# Patient Record
Sex: FEMALE | Race: WHITE
[De-identification: names, ages, dates, MRNs, and addresses within clinical notes are randomized per-mention and may not be internally consistent; named-entity substitution may affect disease eponyms.]

---

## 2017-04-27 ENCOUNTER — HOSPITAL ENCOUNTER (OUTPATIENT)
Dept: HOSPITAL 53 - M LAB | Age: 82
End: 2017-04-27
Attending: FAMILY MEDICINE
Payer: MEDICARE

## 2017-04-27 DIAGNOSIS — K57.32: ICD-10-CM

## 2017-04-27 DIAGNOSIS — Z79.899: ICD-10-CM

## 2017-04-27 DIAGNOSIS — R19.04: Primary | ICD-10-CM

## 2017-04-27 LAB
ALBUMIN SERPL BCG-MCNC: 3.5 GM/DL (ref 3.2–5.2)
ALBUMIN/GLOB SERPL: 1.21 {RATIO} (ref 1–1.93)
ALP SERPL-CCNC: 81 U/L (ref 45–117)
ALT SERPL W P-5'-P-CCNC: 24 U/L (ref 12–78)
ANION GAP SERPL CALC-SCNC: 8 MEQ/L (ref 8–16)
AST SERPL-CCNC: 22 U/L (ref 15–37)
BILIRUB SERPL-MCNC: 0.5 MG/DL (ref 0.2–1)
BUN SERPL-MCNC: 29 MG/DL (ref 7–18)
CALCIUM SERPL-MCNC: 8.2 MG/DL (ref 8.8–10.2)
CHLORIDE SERPL-SCNC: 110 MEQ/L (ref 98–107)
CO2 SERPL-SCNC: 28 MEQ/L (ref 21–32)
CREAT SERPL-MCNC: 1.48 MG/DL (ref 0.55–1.02)
ERYTHROCYTE [DISTWIDTH] IN BLOOD BY AUTOMATED COUNT: 11.9 % (ref 11.5–14.5)
GFR SERPL CREATININE-BSD FRML MDRD: 36.2 ML/MIN/{1.73_M2} (ref 39–?)
GLUCOSE SERPL-MCNC: 94 MG/DL (ref 83–110)
MCH RBC QN AUTO: 30.3 PG (ref 27–33)
MCHC RBC AUTO-ENTMCNC: 32 G/DL (ref 32–36.5)
MCV RBC AUTO: 94.5 FL (ref 80–96)
PLATELET # BLD AUTO: 186 K/MM3 (ref 150–450)
POTASSIUM SERPL-SCNC: 4.4 MEQ/L (ref 3.5–5.1)
PROT SERPL-MCNC: 6.4 GM/DL (ref 6.4–8.2)
SODIUM SERPL-SCNC: 146 MEQ/L (ref 136–145)
WBC # BLD AUTO: 6.9 K/MM3 (ref 4–10)

## 2017-05-03 ENCOUNTER — HOSPITAL ENCOUNTER (OUTPATIENT)
Dept: HOSPITAL 53 - M RAD | Age: 82
End: 2017-05-03
Attending: FAMILY MEDICINE
Payer: MEDICARE

## 2017-05-03 DIAGNOSIS — K57.30: ICD-10-CM

## 2017-05-03 DIAGNOSIS — R19.04: Primary | ICD-10-CM

## 2017-05-03 PROCEDURE — 74178 CT ABD&PLV WO CNTR FLWD CNTR: CPT

## 2017-05-03 NOTE — REP
CT ABDOMEN:

 

REASON: History of left lower quadrant mass.

 

COMPARISON EXAMINATION: None.

 

CONTRAST UTILIZED: 100 mL Isovue-[id number].

 

The precontrast enhanced portion of the examination shows the hepatic and splenic

densities to be within normal limits. There is no cholelithiasis or

nephrolithiasis.

 

Contrast enhanced portion of the examination shows the liver, gallbladder,

spleen, pancreas, adrenal glands, and kidneys to be within normal limits. There

is an incidental centimeter sized cyst in the right kidney and an incidental

subcentimeter sized cyst in the left kidney. The abdominal aorta and paraaortic

regions are within normal limits for the patients age. Calcific atherosclerotic

changes are seen in the abdominal aorta. There is no evidence of an

intraabdominal mass or adenopathy. There is no free fluid or free air. The bowel

loops and their mesenteries are within normal limits.

 

CT PELVIS:

 

High in the left adnexa along the pelvic sidewall there is a 2 cm sized low

density structure which has water Hounsfield unit readings. There is extensive

sigmoid colon diverticulosis. The bowel loops and mesenteries are otherwise

unremarkable. No free fluid or free air is seen in the pelvis.

 

Bone window technique throughout the exam shows age related spinal degenerative

changes.

 

IMPRESSION:

 

1. There is a small cyst in the pelvis as described above which is possibly of

ovarian origin. Exact etiology is uncertain. Consider pelvic ultrasonography.

 

2. Sigmoid colon diverticulosis.

 

3. Incidental bilateral renal cysts.

 

4. Other findings as described above.

 

 

Signed by

Stephane Yanez DO 05/04/2017 04:23 P

## 2018-06-14 ENCOUNTER — HOSPITAL ENCOUNTER (OUTPATIENT)
Dept: HOSPITAL 53 - M LAB | Age: 83
End: 2018-06-14
Attending: FAMILY MEDICINE
Payer: MEDICARE

## 2018-06-14 DIAGNOSIS — E78.00: ICD-10-CM

## 2018-06-14 DIAGNOSIS — I10: Primary | ICD-10-CM

## 2018-06-14 DIAGNOSIS — Z01.818: ICD-10-CM

## 2018-06-14 DIAGNOSIS — Z79.899: ICD-10-CM

## 2018-06-14 DIAGNOSIS — E03.9: ICD-10-CM

## 2018-06-14 LAB
ALBUMIN/GLOBULIN RATIO: 1.19 (ref 1–1.93)
ALBUMIN: 3.8 GM/DL (ref 3.2–5.2)
ALKALINE PHOSPHATASE: 87 U/L (ref 45–117)
ALT SERPL W P-5'-P-CCNC: 26 U/L (ref 12–78)
ANION GAP: 9 MEQ/L (ref 8–16)
AST SERPL-CCNC: 24 U/L (ref 7–37)
BILIRUBIN,TOTAL: 0.7 MG/DL (ref 0.2–1)
BLOOD UREA NITROGEN: 30 MG/DL (ref 7–18)
CALCIUM LEVEL: 8.7 MG/DL (ref 8.8–10.2)
CARBON DIOXIDE LEVEL: 26 MEQ/L (ref 21–32)
CHLORIDE LEVEL: 109 MEQ/L (ref 98–107)
CHOLEST SERPL-MCNC: 196 MG/DL (ref ?–200)
CHOLESTEROL RISK RATIO: 3.06 (ref ?–5)
CREATININE FOR GFR: 1.47 MG/DL (ref 0.55–1.3)
EST. AVERAGE GLUCOSE BLD GHB EST-MCNC: 105 MG/DL (ref 60–110)
GFR SERPL CREATININE-BSD FRML MDRD: 36.4 ML/MIN/{1.73_M2} (ref 32–?)
GLUCOSE, FASTING: 88 MG/DL (ref 70–100)
HDLC SERPL-MCNC: 64 MG/DL (ref 40–?)
HEMATOCRIT: 39.7 % (ref 36–47)
HEMOGLOBIN: 12.6 G/DL (ref 12–15.5)
INR: 0.97
LDL CHOLESTEROL: 107.4 MG/DL (ref ?–100)
MEAN CORPUSCULAR HEMOGLOBIN: 30.4 PG (ref 27–33)
MEAN CORPUSCULAR HGB CONC: 31.7 G/DL (ref 32–36.5)
MEAN CORPUSCULAR VOLUME: 95.7 FL (ref 80–96)
NONHDLC SERPL-MCNC: 132 MG/DL
NRBC BLD AUTO-RTO: 0 % (ref 0–0)
PLATELET COUNT, AUTOMATED: 191 10^3/UL (ref 150–450)
POTASSIUM SERUM: 4.7 MEQ/L (ref 3.5–5.1)
PROTHROMBIN TIME: 13 SECONDS (ref 12.4–14.5)
RED BLOOD COUNT: 4.15 10^6/UL (ref 4–5.4)
RED CELL DISTRIBUTION WIDTH: 12.5 % (ref 11.5–14.5)
SODIUM LEVEL: 144 MEQ/L (ref 136–145)
THYROID STIMULATING HORMONE: 0.07 UIU/ML (ref 0.36–3.74)
TOTAL PROTEIN: 7 GM/DL (ref 6.4–8.2)
TRIGLYCERIDES LEVEL: 123 MG/DL (ref ?–150)
WHITE BLOOD COUNT: 6.8 10^3/UL (ref 4–10)

## 2018-06-14 PROCEDURE — 71046 X-RAY EXAM CHEST 2 VIEWS: CPT

## 2018-12-21 ENCOUNTER — HOSPITAL ENCOUNTER (OUTPATIENT)
Dept: HOSPITAL 53 - M WUC | Age: 83
End: 2018-12-21
Attending: FAMILY MEDICINE
Payer: MEDICARE

## 2018-12-21 DIAGNOSIS — D64.9: Primary | ICD-10-CM

## 2018-12-21 DIAGNOSIS — R53.83: ICD-10-CM

## 2018-12-21 DIAGNOSIS — E03.9: ICD-10-CM

## 2018-12-21 LAB
25(OH)D3 SERPL-MCNC: 30.5 NG/ML (ref 30–100)
ALBUMIN SERPL BCG-MCNC: 3.8 GM/DL (ref 3.2–5.2)
ALT SERPL W P-5'-P-CCNC: 20 U/L (ref 12–78)
BILIRUB SERPL-MCNC: 0.6 MG/DL (ref 0.2–1)
BUN SERPL-MCNC: 38 MG/DL (ref 7–18)
CALCIUM SERPL-MCNC: 9 MG/DL (ref 8.8–10.2)
CHLORIDE SERPL-SCNC: 108 MEQ/L (ref 98–107)
CHOLEST SERPL-MCNC: 174 MG/DL (ref ?–200)
CHOLEST/HDLC SERPL: 3.28 {RATIO} (ref ?–5)
CO2 SERPL-SCNC: 27 MEQ/L (ref 21–32)
CREAT SERPL-MCNC: 1.53 MG/DL (ref 0.55–1.3)
EST. AVERAGE GLUCOSE BLD GHB EST-MCNC: 117 MG/DL (ref 60–110)
GFR SERPL CREATININE-BSD FRML MDRD: 34.7 ML/MIN/{1.73_M2} (ref 32–?)
GLUCOSE SERPL-MCNC: 93 MG/DL (ref 70–100)
HCT VFR BLD AUTO: 41.6 % (ref 36–47)
HDLC SERPL-MCNC: 53 MG/DL (ref 40–?)
HGB BLD-MCNC: 13.2 G/DL (ref 12–15.5)
LDLC SERPL CALC-MCNC: 93 MG/DL (ref ?–100)
MCH RBC QN AUTO: 30.5 PG (ref 27–33)
MCHC RBC AUTO-ENTMCNC: 31.7 G/DL (ref 32–36.5)
MCV RBC AUTO: 96.1 FL (ref 80–96)
NONHDLC SERPL-MCNC: 121 MG/DL
PLATELET # BLD AUTO: 192 10^3/UL (ref 150–450)
POTASSIUM SERPL-SCNC: 4.7 MEQ/L (ref 3.5–5.1)
PROT SERPL-MCNC: 6.8 GM/DL (ref 6.4–8.2)
RBC # BLD AUTO: 4.33 10^6/UL (ref 4–5.4)
SODIUM SERPL-SCNC: 142 MEQ/L (ref 136–145)
TRIGL SERPL-MCNC: 139 MG/DL (ref ?–150)
TSH SERPL DL<=0.005 MIU/L-ACNC: 0.53 UIU/ML (ref 0.36–3.74)
VIT B12 SERPL-MCNC: 559 PG/ML (ref 247–911)
WBC # BLD AUTO: 6.9 10^3/UL (ref 4–10)

## 2021-08-19 ENCOUNTER — HOSPITAL ENCOUNTER (OUTPATIENT)
Dept: HOSPITAL 53 - M WUC | Age: 86
End: 2021-08-19
Attending: PHYSICIAN ASSISTANT
Payer: MEDICARE

## 2021-08-19 DIAGNOSIS — N39.0: Primary | ICD-10-CM

## 2021-09-02 ENCOUNTER — HOSPITAL ENCOUNTER (OUTPATIENT)
Dept: HOSPITAL 53 - M LAB REF | Age: 86
End: 2021-09-02
Attending: PHYSICIAN ASSISTANT
Payer: MEDICARE

## 2021-09-02 DIAGNOSIS — N39.0: Primary | ICD-10-CM

## 2021-12-12 ENCOUNTER — HOSPITAL ENCOUNTER (EMERGENCY)
Dept: HOSPITAL 53 - M ED | Age: 86
Discharge: HOME | End: 2021-12-12
Payer: MEDICARE

## 2021-12-12 VITALS — SYSTOLIC BLOOD PRESSURE: 141 MMHG | DIASTOLIC BLOOD PRESSURE: 68 MMHG

## 2021-12-12 VITALS — BODY MASS INDEX: 29.69 KG/M2 | HEIGHT: 62 IN | WEIGHT: 161.33 LBS

## 2021-12-12 DIAGNOSIS — E03.9: ICD-10-CM
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
## 2021-12-12 DIAGNOSIS — Z87.891: ICD-10-CM

## 2021-12-12 DIAGNOSIS — I10: ICD-10-CM

## 2021-12-12 DIAGNOSIS — S42.292A: Primary | ICD-10-CM

## 2021-12-12 DIAGNOSIS — Z88.2: ICD-10-CM
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
## 2021-12-12 DIAGNOSIS — Z88.1: ICD-10-CM

## 2021-12-12 NOTE — REP
INDICATION:

fall, injury, include shoulder/clavicle.



COMPARISON:

Comparison is made with shows the left proximal humerus on June 14, 2018.  Chest

radiograph which



TECHNIQUE:

Three views of the left humerus are provided.



FINDINGS:

Three views of the left humerus demonstrate an impacted fracture of the surgical neck

of the left humerus with some comminution but no displacement.  The glenohumeral and

acromioclavicular joints are normally aligned.  No scapular or clavicle fracture is

appreciated.    No opaque foreign body noted.





IMPRESSION:

Impacted fracture surgical neck left humerus.







<Electronically signed by David Broussard > 12/12/21 [id number]

## 2021-12-12 NOTE — CCD
Summarization Of Episode

                             Created on: 2021



TALON MARTEL

External Reference #: [id number]

: 1935

Sex: Undifferentiated



Demographics





                          Address                   [address] [place]  [address]

 

                          Home Phone                [phone]

 

                          Preferred Language        English

 

                          Marital Status            Unknown

 

                          Adventism Affiliation     Unknown

 

                          Race                      Unknown

 

                          Ethnic Group              Not  or 





Author





                          Author                    [facility]

 

                          Organization              [facility]

 

                          Address                   Unknown

 

                          Phone                     Unavailable







Support





                Name            Relationship    Address         Phone

 

                RET             Next Of Kin     Unknown         Unavailable

 

                    MOY BARRAGAN    Next Of Kin         [address]

[address] 

[place]  [address] [phone]

 

                    CLAY RASMUSSEN    [place]  [address]                       [phone]

 

                AMAURI              Next Of Kin     Unknown         Unavailable

 

                    DONALD BARRAG[place]   [place]  [address] [phone]







Care Team Providers





                    Care Team Member Name Role                Phone

 

                    Morley,  Diana NP Unavailable         Unavailable

 

                    Morley,  Diana NP Unavailable         Unavailable

 

                    Morley,  Diana NP Unavailable         Unavailable

 

                    Morley,  Diana NP Unavailable         Unavailable

 

                    Morley,  Diana NP Unavailable         Unavailable

 

                    Morley,  Diana NP Unavailable         Unavailable

 

                    Morley,  Diana NP Unavailable         Unavailable

 

                    Morley,  Diana NP Unavailable         Unavailable

 

                    Morley,  Diana NP Unavailable         Unavailable

 

                    Morley,  Diana NP Unavailable         Unavailable

 

                    Morley,  Diana NP Unavailable         Unavailable

 

                    Morley,  Diana NP Unavailable         Unavailable

 

                    Morley,  Diana NP Unavailable         Unavailable

 

                    AYDEN,  MARY PA Unavailable         Unavailable

 

                    AYDEN,  MARY PA Unavailable         Unavailable

 

                    AYDEN,  MARY PA Unavailable         Unavailable

 

                    AYDEN,  MARY PA Unavailable         Unavailable

 

                    AYDEN,  MARY PA Unavailable         Unavailable

 

                    AYDEN,  MARY PA Unavailable         Unavailable

 

                    AYDEN,  MARY PA Unavailable         Unavailable

 

                    AYDEN,  MARY PA Unavailable         Unavailable

 

                    AYDEN,  MARY PA Unavailable         Unavailable

 

                    AYDEN,  MARY PA Unavailable         Unavailable

 

                    AYDEN,  MARY PA Unavailable         Unavailable

 

                    AYDEN,  MARY PA Unavailable         Unavailable

 

                    AYDEN,  MARY PA Unavailable         Unavailable

 

                    AYDEN,  MARY PA Unavailable         Unavailable

 

                    AYDEN,  MARY PA Unavailable         Unavailable

 

                    AYDEN,  MARY PA Unavailable         Unavailable

 

                    AYDEN,  MARY PA Unavailable         Unavailable

 

                    AYDEN,  MARY PA Unavailable         Unavailable

 

                    AYDEN,  MARY PA Unavailable         Unavailable

 

                    AYDEN,  MARY PA Unavailable         Unavailable

 

                    AYDEN,  MARY PA Unavailable         Unavailable

 

                    AYDEN,  AMRY PA Unavailable         Unavailable

 

                    AYDEN,  MARY PA Unavailable         Unavailable

 

                    AYDEN,  MARY PA Unavailable         Unavailable

 

                    AYDEN,  MARY PA Unavailable         Unavailable

 

                    AYDEN,  MARY PA Unavailable         Unavailable

 

                    AYDEN,  MARY PA Unavailable         Unavailable

 

                    AYDEN,  MARY PA Unavailable         Unavailable

 

                    AYDEN,  MARY PA Unavailable         Unavailable

 

                    AYDEN,  MARY PA Unavailable         Unavailable

 

                    AYDEN,  MARY PA Unavailable         Unavailable

 

                    AYDEN,  MARY PA Unavailable         Unavailable

 

                    AYDEN,  MARY PA Unavailable         Unavailable

 

                    AYDEN,  MARY PA Unavailable         Unavailable

 

                    AYDEN,  MARY PA Unavailable         Unavailable

 

                    AYDEN,  MARY PA Unavailable         Unavailable



                                  



Re-disclosure Warning

          The records that you are about to access may contain information from 
federally-assisted alcohol or drug abuse programs. If such information is 
present, then the following federally mandated warning applies: This information
has been disclosed to you from records protected by federal confidentiality 
rules (42 CFR part 2). The federal rules prohibit you from making any further 
disclosure of this information unless further disclosure is expressly permitted 
by the written consent of the person to whom it pertains or as otherwise 
permitted by 42 CFR part 2. A general authorization for the release of medical 
or other information is NOT sufficient for this purpose. The Federal rules 
restrict any use of the information to criminally investigate or prosecute any 
alcohol or drug abuse patient.The records that you are about to access may 
contain highly sensitive health information, the redisclosure of which is 
protected by Article 27-F of the [facility] Public Health law. If you 
continue you may have access to information: Regarding HIV / AIDS; Provided by 
facilities licensed or operated by the [facility] Office of Mental Health; 
or Provided by the [facility] Office for People With Developmental 
Disabilities. If such information is present, then the following [place] 
mandated warning applies: This information has been disclosed to you from 
confidential records which are protected by state law. State law prohibits you 
from making any further disclosure of this information without the specific 
written consent of the person to whom it pertains, or as otherwise permitted by 
law. Any unauthorized further disclosure in violation of state law may result in
a fine or group home sentence or both. A general authorization for the release of 
medical or other information is NOT sufficient authorization for further disc
losure.                                                                         
    



Family History

          



             Family Member Name Family Member Gender Family Member Status Date o

f Status 

Description                             Data Source(s)

 

           Unknown    Unknown    Problem                          [facility] ([facility]

 

           Unknown    Unknown    Problem                          [facility] ([facility]



                                                                                
       



Encounters

          



           Encounter  Providers  Location   Date       Indications Data Source(s

)

 

                Outpatient      Attender: MARY Banda

ry 2021 

08:05:00 AM EDT                                     [facility]

 

                Outpatient      Attender: MARY Banda

ry 2021 

10:45:00 AM EDT                                     [facility]

 

                Outpatient      Attender: Diana loya 2021 

02:50:00 PM EDT                                     [facility]



                                                                                
                           



Immunizations

          



             Vaccine      Date         Status       Description  Data Source(s)

 

             COVID-19 VACCINE Moderna 2021 12:00:00 AM EST completed      

           [facility]

 

                                        Vaccine Series Complete: YESThis Data wa

s Submitted to [facility] [employer]. 

 

             COVID-19 VACCINE Moderna 2021 12:00:00 AM EDT completed      

           [facility]

 

                                        Vaccine Series Complete: YESThis Data wa

s Submitted to [facility] [employer]. 

 

             COVID-19 VACCINE Moderna 2021 12:00:00 AM EDT completed      

           [facility]

 

                                        Vaccine Series Complete: NOThis Data was

 Submitted to [facility] [employer]. 



                                                                                
                           



Medications

          



          Medication Brand Name Start Date Product Form Dose      Route     Admi

nistrative 

Instructions Pharmacy Instructions Status     Indications Reaction   Description

 Data 

Source(s)

 

                                        NITROFURANTOIN, MACROCRYSTALS 25 MG / Ni

trofurantoin, Monohydrate 75 MG Oral 

Capsule [Macrobid] Macrobid 2021 12:00:00 AM EDT             ORAL         

     active              

                                        [facility] ([facility]

 

                                        NITROFURANTOIN, MACROCRYSTALS 25 MG / Ni

trofurantoin, Monohydrate 75 MG Oral 

Capsule [Macrobid] Macrobid 2021 12:00:00 AM EDT               ORAL       

          completed        

                                                    [facility]

 

             valacyclovir 1000 MG Oral Tablet Valacyclovir HCL 2021 12:00:

00 AM EDT               

                                completed                         [facility] ([facility])



                                                                                
        



Insurance Providers

          



             Payer name   Policy type / Coverage type Policy ID    Covered party

 ID Covered 

party's relationship to fischer Policy Fischer             Plan Information

 

                              [id number]                               [id number]

 

                              [id number]                               [id number]

[id number]

 

          MEDICARE            [id number]           SP                  [id number]

[id number]

 

          MEDICARE            [id number]           SP                  [id number]

[id number]

 

          [place]           [id number]           S                 

  [id number]

 

          R       O         [id number]  [id number] S                   [id number]

 

          MEDICARE C         [id number] S                   [id number]

[id number]

 

          [facility]           [id number]            SP               

   [id number]

 

          [employer]               [id number]           SP                  [id number]

 

          Medicare [employer] Medicare Primary           [id number]     Self   

              

 

          [employer]     Medigap Part B           [id number]     Self                 

 

          [employer] PPO O         [id number] S                   [id number]

 

                Medicare [place] Medicare Primary [id number]     

[id number] Self                                    

[id number]

 

          [employer]     Medigap Part B [id number]

43.0 Self                

[id number]

 

          [facility]           [id number]            SP               

   [id number]

 

          [facility]                 [id number]            S                   [id number]

 

          [employer]           [id number]           S           

        [id number]



                                                                                
                                                                                
                                                                                
        



Problems, Conditions, and Diagnoses

          No Information                                                        
                                



Surgeries/Procedures

          



             Procedure    Description  Date         Indications  Data Source(s)

 

             OFFICE OUTPATIENT VISIT 15 MINUTES              2021 12:00:00

 AM EDT              [facility] 

([facility])

 

             OFFICE OUTPATIENT VISIT 15 MINUTES              2021 12:00:00

 AM EDT              [facility] 

([facility])

 

             OFFICE OUTPATIENT NEW 30 MINUTES              2021 12:00:00 A

M EDT              [facility] 

([facility])



                                                                                
                            



Results

          



                    ID                  Date                Data Source

 

                    [id number]             2021 09:58:00 AM EDT [facility] ([facility])









          Name      Value     Range     Interpretation Code Description Data Anne-Marie

rce(s) Supporting 

Document(s)

 

           Bacteria identified in Urine by Culture Laboratory test result       

                           [facility] 

([facility])            

 

                                        Rx Macrobid 









                    ID                  Date                Data Source

 

                    [id number]             2021 11:19:00 AM EDT [facility] ([facility])









          Name      Value     Range     Interpretation Code Description Data Anne-Marie

rce(s) Supporting 

Document(s)

 

           Bacteria identified in Urine by Culture Laboratory test result       

                           [facility] 

([facility])            

 

                                        <content>FULL REPORT IN LAB NOTES (eCW a

nd 

[facility]).</content><br/><content></content><br/><content>ORGANISM 1: ESCHERICHIA 
COLI</content><br/><content></content><br/><content>COLONY COUNT                
  >
100,000</content><br/><content></content><br/><content></content><br/><content>O

RGANISM 1: ESCHERICHIA COLI</content><br/><content></content><br/><content>
ESCHERICHIA COLI:                                  
REACTION</content><br/><content>TRIMETHOPRIM/SULFAMETHOXAZOLE      IV         
160mg TMP & 800mg SMXq6h <=20      S</content><br/><content>
TRIMETHOPRIM/SULFAMETHOXAZOLE      PO         Bactrim DS Bid <=20      
S</content><br/><content>AMPICILLIN                         IV         500mg q6h
 >=32      R</content><br/><content>AMPICILLIN                         PO       
  500mg q6h fasting >=32      R</content><br/><content>GENTAMICIN               
          IV         80mg  q8h <=1      S</content><br/><content>NITROFURANTOIN 
                    PO         100mg BID <=16      
S</content><br/><content>CEFAZOLIN                          IV         1gm q8h 
<=4      S</content><br/><content>LEVOFLOXACIN                       IV         
500mg qd <=0.12      S</content><br/><content>LEVOFLOXACIN                      
 PO         250mg qd <=0.12      S</content><br/><content>LEVOFLOXACIN          
             PO         500mg qd <=0.12      S</content><br/><content>TOBRAMYCIN
                         IV         80mg  q8h <=1      S</content><br/><content>
CEFTRIAXONE                        IV         1gm q24h <=1      
S</content><br/><content>CEFTAZIDIME                        IV         1gm  q8h 
<=1      S</content><br/><content>AMPICILLIN/SULBACTAM               IV         
1.5g q6h 16      I</content><br/><content>PIPERACILLIN/TAZOBACTAM            IV 
        2.25 gm q6h <=4      S</content><br/><content>AZTREONAM                 
         IV         1gm  q8h <=1      S</content><br/><content>ERTAPENEM        
                  IV         1gm qd <=0.5      S</content><br/><content>
MEROPENEM                          IV         1 gm q8h <=0.25      
S</content><br/><content>MEROPENEM                          IV         500 mg 
q8h <=0.25      S</content><br/><content>TIGECYCLINE                        IV  
       50mg q12h <=0.5      S</content><br/><content>CEFEPIME                   
        IV         1 gm q12h <=1      S</content><br/><content>CEFEPIME         
                  IV         2 gm q12h <=1      S</content><br/><content>EXTD 
BRD SPCTRM BETA LACTAMASE     IV         NEGATIVE FOR 
ESBL</content><br/><content></content> 







                                        Procedure

 

                                          



                                                                                
                            



Social History

          



           Code       Duration   Value      Status     Description Data Source(s

)

 

             Smoking      2021 12:00:00 AM EDT Patient is a former smoker 

completed    Patient 

is a former smoker                      [facility]



                                                                                
                  



Vital Signs

          



                    ID                  Date                Data Source

 

                    UNK                                      









           Name       Value      Range      Interpretation Code Description Data

 Source(s)

 

           Body temperature 98.1 [degF]                       98.1 [degF] [facility]

 ([facility])

 

           Body weight 160.00 [lb_av]                       160.00 [lb_av] [facility]

T ([facility])

 

           Systolic blood pressure 129 mm[Hg]                       129 mm[Hg] M

[facility] ([facility])

 

           Diastolic blood pressure 85 mm[Hg]                        85 mm[Hg[facility]  

[facility]

 

           Heart rate 65 /min                          65 /min    [facility] ([facility])

 

           Respiratory rate 14 /min                          14 /min    [facility] (

[facility])

 

           Oxygen saturation in Arterial blood by Pulse oximetry 98 %           

                  98 %       [facility] 

([facility])

 

           Body height 62 [in_i]                        62 [in_i]  [facility] ([facility])

 

                                        5'2" 

 

           Body mass index (BMI) [Ratio] 29.3 kg/m2                       29.3 k

g/m2 [facility] ([facility])

 

           Body height 62 [in_i]                        62 [in_i]  [facility] ([facility])

 

                                        5'2" 

 

           Body weight 160.00 [lb_av]                       160.00 [lb_av] [facility]

T ([facility], 

[facility])

 

           Diastolic blood pressure 82 mm[Hg]                        82 mm[Hg]  

[facility] ([facility] 

[facility])

 

           Heart rate 64 /min                          64 /min    [facility] ([facility])

 

           Respiratory rate 16 /min                          16 /min    [facility] (

[facility])

 

           Oxygen saturation in Arterial blood by Pulse oximetry 98 %           

                  98 %       [facility] 

([facility])

 

           Body temperature 98.0 [degF]                       98.0 [degF] [facility]

 ([facility], 

[facility])

 

           Body mass index (BMI) [Ratio] 29.3 kg/m2                       29.3 k

g/m2 [facility] ([facility]

 

           Systolic blood pressure 143 mm[Hg]                       143 mm[Hg] M

[facility] ([facility],

 [facility])

 

           Systolic blood pressure 109 mm[Hg]                       109 mm[Hg] M

[facility] ([facility],

 [facility])

 

           Diastolic blood pressure 64 mm[Hg]                        64 mm[Hg]  

[facility] ([facility], 

[facility])

 

           Heart rate 64 /min                          64 /min    [facility] ([facility]

[facility])

 

           Respiratory rate 14 /min                          14 /min    [facility] (

[facility])

 

           Oxygen saturation in Arterial blood by Pulse oximetry 97 %           

                  97 %       [facility] 

([facility])

 

           Body temperature 96.2 [degF]                       96.2 [degF] [facility]

 ([facility] 

[facility])

 

           Body weight 160.00 [lb_av]                       160.00 [lb_av] [facility]

T ([facility] 

[facility])

 

           Body height 62 [in_i]                        62 [in_i]  [facility] ([facility])

 

                                        5'2" 

 

           Body mass index (BMI) [Ratio] 29.3 kg/m2                       29.3 k

g/m2 [facility] ([facility])

## 2021-12-12 NOTE — CCD
Summarization Of Episode

                             Created on: 2021



TALON MARTEL

External Reference #: [id number]

: 1935

Sex: Undifferentiated



Demographics





                          Address                   [address] [place]  [address]

 

                          Home Phone                [phone]

 

                          Preferred Language        English

 

                          Marital Status            Unknown

 

                          Lutheran Affiliation     Unknown

 

                          Race                      Unknown

 

                          Ethnic Group              Not  or 





Author





                          Author                    [facility]

 

                          Organization              [facility]

 

                          Address                   Unknown

 

                          Phone                     Unavailable







Support





                Name            Relationship    Address         Phone

 

                RET             Next Of Kin     Unknown         Unavailable

 

                    MOY BARRAGAN    Next Of Kin         [address]

[address] 

[place]  [address] [phone]

 

                    CLAY RASMUSSEN    [place]  [address]                       [phone]

 

                AMAURI              Next Of Kin     Unknown         Unavailable

 

                    DONALD BARRAG[place]  [address] [phone]







Care Team Providers





                    Care Team Member Name Role                Phone

 

                    Morley,  Diana NP Unavailable         Unavailable

 

                    Morley,  Diana NP Unavailable         Unavailable

 

                    Morley,  Diana NP Unavailable         Unavailable

 

                    Morley,  Diana NP Unavailable         Unavailable

 

                    Morley,  Diana NP Unavailable         Unavailable

 

                    Morley,  Diana NP Unavailable         Unavailable

 

                    Morley,  Diana NP Unavailable         Unavailable

 

                    Morley,  Diana NP Unavailable         Unavailable

 

                    Morley,  Diana NP Unavailable         Unavailable

 

                    Morley,  Diana NP Unavailable         Unavailable

 

                    Morley,  Diana NP Unavailable         Unavailable

 

                    Morley,  Diana NP Unavailable         Unavailable

 

                    Morley,  Diana NP Unavailable         Unavailable

 

                    AYDEN,  MARY PA Unavailable         Unavailable

 

                    AYDEN,  MARY PA Unavailable         Unavailable

 

                    AYDEN,  MARY PA Unavailable         Unavailable

 

                    AYDEN,  MARY PA Unavailable         Unavailable

 

                    AYDEN,  MARY PA Unavailable         Unavailable

 

                    AYDEN,  MARY PA Unavailable         Unavailable

 

                    AYDEN,  MARY PA Unavailable         Unavailable

 

                    AYDEN,  MARY PA Unavailable         Unavailable

 

                    AYDEN,  MARY PA Unavailable         Unavailable

 

                    AYDEN,  MARY PA Unavailable         Unavailable

 

                    AYDEN,  MARY PA Unavailable         Unavailable

 

                    AYDEN,  MARY PA Unavailable         Unavailable

 

                    AYDEN,  MARY PA Unavailable         Unavailable

 

                    AYDEN,  MARY PA Unavailable         Unavailable

 

                    AYDEN,  MARY PA Unavailable         Unavailable

 

                    AYDEN,  MARY PA Unavailable         Unavailable

 

                    AYDEN,  MARY PA Unavailable         Unavailable

 

                    AYDEN,  MARY PA Unavailable         Unavailable

 

                    AYDEN,  MARY PA Unavailable         Unavailable

 

                    AYDEN,  MARY PA Unavailable         Unavailable

 

                    AYDEN,  MARY PA Unavailable         Unavailable

 

                    AYDEN,  MARY PA Unavailable         Unavailable

 

                    AYDEN,  MARY PA Unavailable         Unavailable

 

                    AYDEN,  MARY PA Unavailable         Unavailable

 

                    AYDEN,  MARY PA Unavailable         Unavailable

 

                    AYDEN,  MARY PA Unavailable         Unavailable

 

                    AYDEN,  MARY PA Unavailable         Unavailable

 

                    AYDEN,  MARY PA Unavailable         Unavailable

 

                    AYDEN,  MARY PA Unavailable         Unavailable

 

                    AYDEN,  MARY PA Unavailable         Unavailable

 

                    AYDEN,  MARY PA Unavailable         Unavailable

 

                    AYDEN,  MARY PA Unavailable         Unavailable

 

                    AYDEN,  MARY PA Unavailable         Unavailable

 

                    AYDEN,  MARY PA Unavailable         Unavailable

 

                    AYDEN,  MARY PA Unavailable         Unavailable

 

                    AYDEN,  MARY PA Unavailable         Unavailable



                                  



Re-disclosure Warning

          The records that you are about to access may contain information from 
federally-assisted alcohol or drug abuse programs. If such information is 
present, then the following federally mandated warning applies: This information
has been disclosed to you from records protected by federal confidentiality 
rules (42 CFR part 2). The federal rules prohibit you from making any further 
disclosure of this information unless further disclosure is expressly permitted 
by the written consent of the person to whom it pertains or as otherwise 
permitted by 42 CFR part 2. A general authorization for the release of medical 
or other information is NOT sufficient for this purpose. The Federal rules 
restrict any use of the information to criminally investigate or prosecute any 
alcohol or drug abuse patient.The records that you are about to access may 
contain highly sensitive health information, the redisclosure of which is 
protected by Article 27-F of the [facility] Public Health law. If you 
continue you may have access to information: Regarding HIV / AIDS; Provided by 
facilities licensed or operated by the [facility] Office of Mental Health; 
or Provided by the [facility] Office for People With Developmental 
Disabilities. If such information is present, then the following [place] 
mandated warning applies: This information has been disclosed to you from 
confidential records which are protected by state law. State law prohibits you 
from making any further disclosure of this information without the specific 
written consent of the person to whom it pertains, or as otherwise permitted by 
law. Any unauthorized further disclosure in violation of state law may result in
a fine or [facility] sentence or both. A general authorization for the release of 
medical or other information is NOT sufficient authorization for further disc
losure.                                                                         
    



Family History

          



             Family Member Name Family Member Gender Family Member Status Date o

f Status 

Description                             Data Source(s)

 

           Unknown    Unknown    Problem                          [facility] ([facility]

 

           Unknown    Unknown    Problem                          [facility] ([facility]



                                                                                
       



Encounters

          



           Encounter  Providers  Location   Date       Indications Data Source(s

)

 

                Outpatient      Attender: MARY Banda

ry 2021 

08:05:00 AM EDT                                     [facility] ([facility]

 

                Outpatient      Attender: MARY Banda

ry 2021 

10:45:00 AM EDT                                     [facility] ([facility]

 

                Outpatient      Attender: Diana loya 2021 

02:50:00 PM EDT                                     [facility] ([facility]



                                                                                
                           



Immunizations

          



             Vaccine      Date         Status       Description  Data Source(s)

 

             COVID-19 VACCINE Moderna 2021 12:00:00 AM EST completed      

           [facility]

 

                                        Vaccine Series Complete: YESThis Data wa

s Submitted to [facility]. 

 

             COVID-19 VACCINE Moderna 2021 12:00:00 AM EDT completed      

           [facility]

 

                                        Vaccine Series Complete: YESThis Data wa

s Submitted to [facility]. 

 

             COVID-19 VACCINE Moderna 2021 12:00:00 AM EDT completed      

           [facility]

 

                                        Vaccine Series Complete: NOThis Data was

 Submitted to [facility]. 



                                                                                
                           



Medications

          



          Medication Brand Name Start Date Product Form Dose      Route     Admi

nistrative 

Instructions Pharmacy Instructions Status     Indications Reaction   Description

 Data 

Source(s)

 

                                        NITROFURANTOIN, MACROCRYSTALS 25 MG / Ni

trofurantoin, Monohydrate 75 MG Oral 

Capsule [Macrobid] Macrobid 2021 12:00:00 AM EDT             ORAL         

     active              

                                        [facility] ([facility]

 

                                        NITROFURANTOIN, MACROCRYSTALS 25 MG / Ni

trofurantoin, Monohydrate 75 MG Oral 

Capsule [Macrobid] Macrobid 2021 12:00:00 AM EDT               ORAL       

          completed        

                                                    [facility] ([facility]

 

             valacyclovir 1000 MG Oral Tablet Valacyclovir HCL 2021 12:00:

00 AM EDT               

                                completed                         [facility] ([facility])



                                                                                
        



Insurance Providers

          



             Payer name   Policy type / Coverage type Policy ID    Covered party

 ID Covered 

party's relationship to fischer Policy Fischer             Plan Information

 

                              [id number]                               [id number]

 

                              [id number]                               [id number]

[id number]

 

          MEDICARE            [id number]           SP                  [id number]

[id number]

 

          MEDICARE            [id number]           SP                  [id number]

[id number]

 

          [place]           [id number]           S                 

  [id number]

 

          R       O         [id number]  [id number] S                   [id number]

 

          MEDICARE C         [id number] S                   [id number]

[id number]

 

          [facility]           [id number]            SP               

   [id number]

 

          [employer]               [id number]           SP                  [id number]

 

          Medicare [employer] Medicare Primary           [id number]     Self   

              

 

          [employer]     Medigap Part B           [id number]     Self                 

 

          [employer] PPO O         [id number] S                   [id number]

 

                Medicare [place] Medicare Primary [id number]     

[id number] Self                                    

[id number]

 

          [employer]     Medigap Part B [id number]

43.0 Self                

[id number]

 

          [facility]           [id number]            SP               

   [id number]

 

          [facility]                 [id number]            S                   [id number]

 

          [employer]           [id number]           S           

        [id number]



                                                                                
                                                                                
                                                                                
        



Problems, Conditions, and Diagnoses

          No Information                                                        
                                



Surgeries/Procedures

          



             Procedure    Description  Date         Indications  Data Source(s)

 

             OFFICE OUTPATIENT VISIT 15 MINUTES              2021 12:00:00

 AM EDT              [facility] 

([facility])

 

             OFFICE OUTPATIENT VISIT 15 MINUTES              2021 12:00:00

 AM EDT              [facility] 

([facility])

 

             OFFICE OUTPATIENT NEW 30 MINUTES              2021 12:00:00 A

M EDT              [facility] 

([facility])



                                                                                
                            



Results

          



                    ID                  Date                Data Source

 

                    [id number]             2021 09:58:00 AM EDT [facility] ([facility])









          Name      Value     Range     Interpretation Code Description Data Anne-Marie

rce(s) Supporting 

Document(s)

 

           Bacteria identified in Urine by Culture Laboratory test result       

                           [facility] 

([facility])            

 

                                        Rx Macrobid 









                    ID                  Date                Data Source

 

                    [id number]             2021 11:19:00 AM EDT [facility] ([facility])









          Name      Value     Range     Interpretation Code Description Data Anne-Marie

rce(s) Supporting 

Document(s)

 

           Bacteria identified in Urine by Culture Laboratory test result       

                           [facility] 

([facility])            

 

                                        <content>FULL REPORT IN LAB NOTES (e[facility] a

nd 

[facility]).</content><br/><content></content><br/><content>ORGANISM 1: ESCHERICHIA 
COLI</content><br/><content></content><br/><content>COLONY COUNT                
  >
100,000</content><br/><content></content><br/><content></content><br/><content>O

RGANISM 1: ESCHERICHIA COLI</content><br/><content></content><br/><content>
ESCHERICHIA COLI:                                  
REACTION</content><br/><content>TRIMETHOPRIM/SULFAMETHOXAZOLE      IV         
160mg TMP & 800mg SMXq6h <=20      S</content><br/><content>
TRIMETHOPRIM/SULFAMETHOXAZOLE      PO         Bactrim DS Bid <=20      
S</content><br/><content>AMPICILLIN                         IV         500mg q6h
 >=32      R</content><br/><content>AMPICILLIN                         PO       
  500mg q6h fasting >=32      R</content><br/><content>GENTAMICIN               
          IV         80mg  q8h <=1      S</content><br/><content>NITROFURANTOIN 
                    PO         100mg BID <=16      
S</content><br/><content>CEFAZOLIN                          IV         1gm q8h 
<=4      S</content><br/><content>LEVOFLOXACIN                       IV         
500mg qd <=0.12      S</content><br/><content>LEVOFLOXACIN                      
 PO         250mg qd <=0.12      S</content><br/><content>LEVOFLOXACIN          
             PO         500mg qd <=0.12      S</content><br/><content>TOBRAMYCIN
                         IV         80mg  q8h <=1      S</content><br/><content>
CEFTRIAXONE                        IV         1gm q24h <=1      
S</content><br/><content>CEFTAZIDIME                        IV         1gm  q8h 
<=1      S</content><br/><content>AMPICILLIN/SULBACTAM               IV         
1.5g q6h 16      I</content><br/><content>PIPERACILLIN/TAZOBACTAM            IV 
        2.25 gm q6h <=4      S</content><br/><content>AZTREONAM                 
         IV         1gm  q8h <=1      S</content><br/><content>ERTAPENEM        
                  IV         1gm qd <=0.5      S</content><br/><content>
MEROPENEM                          IV         1 gm q8h <=0.25      
S</content><br/><content>MEROPENEM                          IV         500 mg 
q8h <=0.25      S</content><br/><content>TIGECYCLINE                        IV  
       50mg q12h <=0.5      S</content><br/><content>CEFEPIME                   
        IV         1 gm q12h <=1      S</content><br/><content>CEFEPIME         
                  IV         2 gm q12h <=1      S</content><br/><content>EXTD 
BRD SPCTRM BETA LACTAMASE     IV         NEGATIVE FOR 
ESBL</content><br/><content></content> 







                                        Procedure

 

                                          



                                                                                
                            



Social History

          



           Code       Duration   Value      Status     Description Data Source(s

)

 

             Smoking      2021 12:00:00 AM EDT Patient is a former smoker 

completed    Patient 

is a former smoker                      [facility] ([facility])



                                                                                
                  



Vital Signs

          



                    ID                  Date                Data Source

 

                    UNK                                      









           Name       Value      Range      Interpretation Code Description Data

 Source(s)

 

           Body temperature 98.1 [degF]                       98.1 [degF] [facility]

 ([facility])

 

           Body weight 160.00 [lb_av]                       160.00 [lb_av] [facility]

T ([facility])

 

           Body height 62 [in_i]                        62 [in_i]  [facility] ([facility])

 

                                        5'2" 

 

           Systolic blood pressure 129 mm[Hg]                       129 mm[Hg] [facility]

 

           Diastolic blood pressure 85 mm[Hg]                        85 mm[Hg]  

[facility] ([facility] 

[facility])

 

           Heart rate 65 /min                          65 /min    [facility] ([facility]

[facility])

 

           Respiratory rate 14 /min                          14 /min    [facility] (

[facility], [facility])

 

           Oxygen saturation in Arterial blood by Pulse oximetry 98 %           

                  98 %       [facility] 

([facility])

 

           Body mass index (BMI) [Ratio] 29.3 kg/m2                       29.3 k

g/m2 [facility] ([facility])

 

           Body height 62 [in_i]                        62 [in_i]  [facility] ([facility])

 

                                        5'2" 

 

           Body weight 160.00 [lb_av]                       160.00 [lb_av] [facility]

T ([facility], 

[facility])

 

           Diastolic blood pressure 82 mm[Hg]                        82 mm[Hg]  

[facility] ([facility], 

[facility])

 

           Heart rate 64 /min                          64 /min    [facility] ([facility]

[facility])

 

           Systolic blood pressure 143 mm[Hg]                       143 mm[Hg] M

[facility] ([facility],

 [facility])

 

           Respiratory rate 16 /min                          16 /min    [facility] (

[facility])

 

           Oxygen saturation in Arterial blood by Pulse oximetry 98 %           

                  98 %       [facility]

 

           Body temperature 98.0 [degF]                       98.0 [degF] [facility]

 ([facility], 

[facility])

 

           Body mass index (BMI) [Ratio] 29.3 kg/m2                       29.3 k

g/m2 [facility] ([facility])

 

           Systolic blood pressure 109 mm[Hg]                       109 mm[Hg] M

[facility] ([facility],

 [facility])

 

           Diastolic blood pressure 64 mm[Hg]                        64 mm[Hg]  

[facility] ([facility], 

[facility])

 

           Heart rate 64 /min                          64 /min    [facility] ([facility]

[facility])

 

           Respiratory rate 14 /min                          14 /min    [facility] (

[facility])

 

           Oxygen saturation in Arterial blood by Pulse oximetry 97 %           

                  97 %       [facility] 

([facility])

 

           Body temperature 96.2 [degF]                       96.2 [degF] [facility]

 ([facility], 

[facility])

 

           Body weight 160.00 [lb_av]                       160.00 [lb_av] [facility]

T ([facility], 

[facility])

 

           Body height 62 [in_i]                        62 [in_i]  [facility] ([facility], [facility])

 

                                        5'2" 

 

           Body mass index (BMI) [Ratio] 29.3 kg/m2                       29.3 k

g/m2 [facility] ([facility], [facility])

## 2021-12-16 ENCOUNTER — HOSPITAL ENCOUNTER (OUTPATIENT)
Dept: HOSPITAL 53 - M SOG | Age: 86
End: 2021-12-16
Attending: ORTHOPAEDIC SURGERY
Payer: MEDICARE
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
## 2021-12-16 DIAGNOSIS — S42.222A: Primary | ICD-10-CM

## 2021-12-16 NOTE — REP
INDICATION:

LT HUMERUS FX.



COMPARISON:

None.



TECHNIQUE:

Two views of the left shoulder



FINDINGS:

There is a comminuted impacted fracture through the humeral neck.  Associated anterior

subluxation on Y-view suggested.



IMPRESSION:

Comminuted impacted fracture through the humeral neck.





<Electronically signed by Harinder Coles > 12/16/21 [id number]

## 2021-12-30 ENCOUNTER — HOSPITAL ENCOUNTER (OUTPATIENT)
Dept: HOSPITAL 53 - M SOG | Age: 86
End: 2021-12-30
Attending: ORTHOPAEDIC SURGERY
Payer: MEDICARE
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
## 2021-12-30 DIAGNOSIS — S42.222A: Primary | ICD-10-CM

## 2021-12-30 NOTE — REP
INDICATION:

LT HUMERUS FX.



COMPARISON:

12/16/2020



TECHNIQUE:

Two views



FINDINGS:

The previously described proximal humeral fracture is again identified with indistinct

margins and callus formation consistent with healing.  There is no significant change

in the appearance of alignment.



IMPRESSION:

Healing fracture as described above.





<Electronically signed by Stephane Yanez > 12/30/21 1200

## 2022-03-02 ENCOUNTER — HOSPITAL ENCOUNTER (OUTPATIENT)
Dept: HOSPITAL 53 - M SOG | Age: 87
End: 2022-03-02
Attending: ORTHOPAEDIC SURGERY
Payer: MEDICARE

## 2022-03-02 DIAGNOSIS — S42.222P: Primary | ICD-10-CM

## 2022-09-07 ENCOUNTER — HOSPITAL ENCOUNTER (OUTPATIENT)
Dept: HOSPITAL 53 - M PLALAB | Age: 87
End: 2022-09-07
Attending: PHYSICIAN ASSISTANT
Payer: MEDICARE

## 2022-09-07 DIAGNOSIS — I10: ICD-10-CM

## 2022-09-07 DIAGNOSIS — E78.5: Primary | ICD-10-CM

## 2022-09-07 DIAGNOSIS — E03.9: ICD-10-CM

## 2022-09-07 DIAGNOSIS — F01.50: ICD-10-CM

## 2022-09-07 DIAGNOSIS — Z13.1: ICD-10-CM

## 2022-09-07 DIAGNOSIS — Z79.899: ICD-10-CM

## 2022-09-07 LAB
ALBUMIN SERPL BCG-MCNC: 3.9 GM/DL (ref 3.2–5.2)
ALT SERPL W P-5'-P-CCNC: 15 U/L (ref 12–78)
BASOPHILS # BLD AUTO: 0.1 10^3/UL (ref 0–0.2)
BASOPHILS NFR BLD AUTO: 0.8 % (ref 0–1)
BILIRUB SERPL-MCNC: 0.6 MG/DL (ref 0.2–1)
BUN SERPL-MCNC: 39 MG/DL (ref 7–18)
CALCIUM SERPL-MCNC: 9.2 MG/DL (ref 8.8–10.2)
CHLORIDE SERPL-SCNC: 107 MEQ/L (ref 98–107)
CHOLEST SERPL-MCNC: 191 MG/DL (ref ?–200)
CHOLEST/HDLC SERPL: 3.35 {RATIO} (ref ?–5)
CO2 SERPL-SCNC: 29 MEQ/L (ref 21–32)
CREAT SERPL-MCNC: 1.72 MG/DL (ref 0.55–1.3)
EOSINOPHIL # BLD AUTO: 0.3 10^3/UL (ref 0–0.5)
EOSINOPHIL NFR BLD AUTO: 3.6 % (ref 0–3)
EST. AVERAGE GLUCOSE BLD GHB EST-MCNC: 111 MG/DL (ref 60–110)
GFR SERPL CREATININE-BSD FRML MDRD: 30 ML/MIN/{1.73_M2} (ref 32–?)
GLUCOSE SERPL-MCNC: 82 MG/DL (ref 70–100)
HCT VFR BLD AUTO: 43.3 % (ref 36–47)
HDLC SERPL-MCNC: 57 MG/DL (ref 40–?)
HGB BLD-MCNC: 13.4 G/DL (ref 12–15.5)
LDLC SERPL CALC-MCNC: 107 MG/DL (ref ?–100)
LYMPHOCYTES # BLD AUTO: 1.6 10^3/UL (ref 1.5–5)
LYMPHOCYTES NFR BLD AUTO: 21.7 % (ref 24–44)
MCH RBC QN AUTO: 30.9 PG (ref 27–33)
MCHC RBC AUTO-ENTMCNC: 30.9 G/DL (ref 32–36.5)
MCV RBC AUTO: 99.8 FL (ref 80–96)
MONOCYTES # BLD AUTO: 0.7 10^3/UL (ref 0–0.8)
MONOCYTES NFR BLD AUTO: 9.1 % (ref 2–8)
NEUTROPHILS # BLD AUTO: 4.7 10^3/UL (ref 1.5–8.5)
NEUTROPHILS NFR BLD AUTO: 64.5 % (ref 36–66)
NONHDLC SERPL-MCNC: 134 MG/DL
PLATELET # BLD AUTO: 221 10^3/UL (ref 150–450)
POTASSIUM SERPL-SCNC: 4.8 MEQ/L (ref 3.5–5.1)
PROT SERPL-MCNC: 7 GM/DL (ref 6.4–8.2)
RBC # BLD AUTO: 4.34 10^6/UL (ref 4–5.4)
SODIUM SERPL-SCNC: 139 MEQ/L (ref 136–145)
T4 FREE SERPL-MCNC: 1 NG/DL (ref 0.76–1.46)
TRIGL SERPL-MCNC: 136 MG/DL (ref ?–150)
TSH SERPL DL<=0.005 MIU/L-ACNC: 16.3 UIU/ML (ref 0.36–3.74)
WBC # BLD AUTO: 7.2 10^3/UL (ref 4–10)

## 2022-09-09 LAB — VIT B12 SERPL-MCNC: 522 PG/ML (ref 232–1245)

## 2022-09-27 ENCOUNTER — HOSPITAL ENCOUNTER (OUTPATIENT)
Dept: HOSPITAL 53 - M SFHCPLAZ | Age: 87
End: 2022-09-27
Attending: PHYSICIAN ASSISTANT
Payer: MEDICARE

## 2022-09-27 DIAGNOSIS — R30.0: Primary | ICD-10-CM

## 2023-06-12 ENCOUNTER — HOSPITAL ENCOUNTER (OUTPATIENT)
Dept: HOSPITAL 53 - M PLALAB | Age: 88
End: 2023-06-12
Attending: PHYSICIAN ASSISTANT
Payer: MEDICARE

## 2023-06-12 DIAGNOSIS — I10: ICD-10-CM

## 2023-06-12 DIAGNOSIS — E78.5: ICD-10-CM

## 2023-06-12 DIAGNOSIS — Z13.1: ICD-10-CM

## 2023-06-12 DIAGNOSIS — R30.0: Primary | ICD-10-CM

## 2023-06-12 DIAGNOSIS — F32.A: ICD-10-CM

## 2023-06-12 DIAGNOSIS — Z79.899: ICD-10-CM

## 2023-06-12 DIAGNOSIS — E03.9: ICD-10-CM

## 2023-06-12 LAB
ALBUMIN SERPL BCG-MCNC: 3.8 G/DL (ref 3.2–5.2)
ALP SERPL-CCNC: 84 U/L (ref 46–116)
ALT SERPL W P-5'-P-CCNC: 17 U/L (ref 7–40)
APPEARANCE UR: CLEAR
AST SERPL-CCNC: 17 U/L (ref ?–34)
BASOPHILS # BLD AUTO: 0 10^3/UL (ref 0–0.2)
BASOPHILS NFR BLD AUTO: 0.5 % (ref 0–1)
BILIRUB SERPL-MCNC: 0.6 MG/DL (ref 0.3–1.2)
BILIRUB UR QL STRIP: NEGATIVE
BUN SERPL-MCNC: 33 MG/DL (ref 9–23)
CALCIUM SERPL-MCNC: 8.7 MG/DL (ref 8.3–10.6)
CHLORIDE SERPL-SCNC: 107 MMOL/L (ref 98–107)
CHOLEST SERPL-MCNC: 154 MG/DL (ref ?–200)
CHOLEST/HDLC SERPL: 3.08 {RATIO} (ref ?–5)
CO2 SERPL-SCNC: 26 MMOL/L (ref 20–31)
COLOR UR: YELLOW
CREAT SERPL-MCNC: 1.46 MG/DL (ref 0.55–1.3)
EOSINOPHIL # BLD AUTO: 0.3 10^3/UL (ref 0–0.5)
EOSINOPHIL NFR BLD AUTO: 3.9 % (ref 0–3)
EST. AVERAGE GLUCOSE BLD GHB EST-MCNC: 111 MG/DL (ref 60–110)
GFR SERPL CREATININE-BSD FRML MDRD: 36.2 ML/MIN/{1.73_M2} (ref 32–?)
GLUCOSE SERPL-MCNC: 86 MG/DL (ref 74–106)
GLUCOSE UR STRIP-MCNC: NEGATIVE MG/DL
HCT VFR BLD AUTO: 41.4 % (ref 36–47)
HDLC SERPL-MCNC: 50 MG/DL (ref 40–?)
HGB BLD-MCNC: 12.6 G/DL (ref 12–15.5)
HGB UR QL STRIP: NEGATIVE
KETONES UR QL STRIP: NEGATIVE MG/DL
LDLC SERPL CALC-MCNC: 81.4 MG/DL (ref ?–100)
LEUKOCYTE ESTERASE UR QL STRIP: NEGATIVE
LYMPHOCYTES # BLD AUTO: 1.4 10^3/UL (ref 1.5–5)
LYMPHOCYTES NFR BLD AUTO: 16.6 % (ref 24–44)
MCH RBC QN AUTO: 29.9 PG (ref 27–33)
MCHC RBC AUTO-ENTMCNC: 30.4 G/DL (ref 32–36.5)
MCV RBC AUTO: 98.1 FL (ref 80–96)
MONOCYTES # BLD AUTO: 0.8 10^3/UL (ref 0–0.8)
MONOCYTES NFR BLD AUTO: 9.1 % (ref 2–8)
NEUTROPHILS # BLD AUTO: 5.9 10^3/UL (ref 1.5–8.5)
NEUTROPHILS NFR BLD AUTO: 69.5 % (ref 36–66)
NITRITE UR QL STRIP: NEGATIVE
NONHDLC SERPL-MCNC: 104 MG/DL
PH UR STRIP: 5 UNITS (ref 5–7)
PLATELET # BLD AUTO: 201 10^3/UL (ref 150–450)
POTASSIUM SERPL-SCNC: 4.9 MMOL/L (ref 3.5–5.1)
PROT SERPL-MCNC: 6.5 G/DL (ref 5.7–8.2)
PROT UR STRIP-MCNC: NEGATIVE MG/DL
RBC # BLD AUTO: 4.22 10^6/UL (ref 4–5.4)
SODIUM SERPL-SCNC: 141 MMOL/L (ref 136–145)
SP GR UR STRIP: 1.02 (ref 1–1.03)
T4 FREE SERPL-MCNC: 1.04 NG/DL (ref 0.89–1.76)
TRIGL SERPL-MCNC: 113 MG/DL (ref ?–150)
TSH SERPL DL<=0.005 MIU/L-ACNC: 1.42 UIU/ML (ref 0.55–4.78)
UROBILINOGEN UR QL STRIP: NORMAL MG/DL
WBC # BLD AUTO: 8.5 10^3/UL (ref 4–10)

## 2023-07-24 ENCOUNTER — HOSPITAL ENCOUNTER (EMERGENCY)
Dept: HOSPITAL 53 - M ED | Age: 88
LOS: 1 days | Discharge: LEFT BEFORE DISCHARGE | End: 2023-07-25
Payer: MEDICARE

## 2023-07-24 VITALS
WEIGHT: 169.54 LBS | HEIGHT: 62 IN | BODY MASS INDEX: 31.2 KG/M2 | TEMPERATURE: 96.8 F | DIASTOLIC BLOOD PRESSURE: 79 MMHG | OXYGEN SATURATION: 97 % | SYSTOLIC BLOOD PRESSURE: 189 MMHG

## 2023-07-24 DIAGNOSIS — Z53.21: Primary | ICD-10-CM

## 2024-08-13 ENCOUNTER — HOSPITAL ENCOUNTER (OUTPATIENT)
Dept: HOSPITAL 53 - M WUC | Age: 89
End: 2024-08-13
Attending: PHYSICIAN ASSISTANT
Payer: MEDICARE

## 2024-08-13 DIAGNOSIS — E78.5: Primary | ICD-10-CM

## 2024-08-13 DIAGNOSIS — I10: ICD-10-CM

## 2024-08-13 DIAGNOSIS — E03.9: ICD-10-CM

## 2024-08-13 DIAGNOSIS — Z13.1: ICD-10-CM

## 2024-08-13 DIAGNOSIS — F41.9: ICD-10-CM

## 2024-08-13 LAB
ALBUMIN SERPL BCG-MCNC: 3.6 G/DL (ref 3.2–5.2)
ALP SERPL-CCNC: 104 U/L (ref 46–116)
ALT SERPL W P-5'-P-CCNC: 11 U/L (ref 7–40)
AST SERPL-CCNC: 16 U/L (ref ?–34)
BASOPHILS # BLD AUTO: 0.1 10^3/UL (ref 0–0.2)
BASOPHILS NFR BLD AUTO: 0.8 % (ref 0–1)
BILIRUB SERPL-MCNC: 0.8 MG/DL (ref 0.3–1.2)
BUN SERPL-MCNC: 26 MG/DL (ref 9–23)
CALCIUM SERPL-MCNC: 9.1 MG/DL (ref 8.3–10.6)
CHLORIDE SERPL-SCNC: 108 MMOL/L (ref 98–107)
CHOLEST SERPL-MCNC: 160 MG/DL (ref ?–200)
CHOLEST/HDLC SERPL: 3.53 {RATIO} (ref ?–5)
CO2 SERPL-SCNC: 28 MMOL/L (ref 20–31)
CREAT SERPL-MCNC: 1.66 MG/DL (ref 0.55–1.3)
EOSINOPHIL # BLD AUTO: 0.5 10^3/UL (ref 0–0.5)
EOSINOPHIL NFR BLD AUTO: 6.2 % (ref 0–3)
EST. AVERAGE GLUCOSE BLD GHB EST-MCNC: 108 MG/DL (ref 60–110)
GFR SERPL CREATININE-BSD FRML MDRD: 31.1 ML/MIN/{1.73_M2} (ref 32–?)
GLUCOSE SERPL-MCNC: 93 MG/DL (ref 74–106)
HCT VFR BLD AUTO: 38.9 % (ref 36–47)
HDLC SERPL-MCNC: 45.2 MG/DL (ref 40–?)
HGB BLD-MCNC: 12.1 G/DL (ref 12–15.5)
LDLC SERPL CALC-MCNC: 95.4 MG/DL (ref ?–100)
LYMPHOCYTES # BLD AUTO: 1.4 10^3/UL (ref 1.5–5)
LYMPHOCYTES NFR BLD AUTO: 17.2 % (ref 24–44)
MCH RBC QN AUTO: 29.7 PG (ref 27–33)
MCHC RBC AUTO-ENTMCNC: 31.1 G/DL (ref 32–36.5)
MCV RBC AUTO: 95.3 FL (ref 80–96)
MONOCYTES # BLD AUTO: 0.8 10^3/UL (ref 0–0.8)
MONOCYTES NFR BLD AUTO: 10.4 % (ref 2–8)
NEUTROPHILS # BLD AUTO: 5.1 10^3/UL (ref 1.5–8.5)
NEUTROPHILS NFR BLD AUTO: 65.3 % (ref 36–66)
NONHDLC SERPL-MCNC: 114.8 MG/DL
PLATELET # BLD AUTO: 218 10^3/UL (ref 150–450)
POTASSIUM SERPL-SCNC: 4.7 MMOL/L (ref 3.5–5.1)
PROT SERPL-MCNC: 6.7 G/DL (ref 5.7–8.2)
RBC # BLD AUTO: 4.08 10^6/UL (ref 4–5.4)
SODIUM SERPL-SCNC: 143 MMOL/L (ref 136–145)
T4 FREE SERPL-MCNC: 1.36 NG/DL (ref 0.89–1.76)
TRIGL SERPL-MCNC: 97 MG/DL (ref ?–150)
TSH SERPL DL<=0.005 MIU/L-ACNC: 0.1 UIU/ML (ref 0.55–4.78)
WBC # BLD AUTO: 7.9 10^3/UL (ref 4–10)

## 2025-02-02 ENCOUNTER — HOSPITAL ENCOUNTER (EMERGENCY)
Dept: HOSPITAL 53 - M ED | Age: 89
Discharge: HOME | End: 2025-02-02
Payer: MEDICARE

## 2025-02-02 VITALS — WEIGHT: 157.32 LBS | HEIGHT: 63 IN | BODY MASS INDEX: 27.88 KG/M2

## 2025-02-02 VITALS — SYSTOLIC BLOOD PRESSURE: 188 MMHG | TEMPERATURE: 97.2 F | DIASTOLIC BLOOD PRESSURE: 76 MMHG | OXYGEN SATURATION: 98 %

## 2025-02-02 DIAGNOSIS — Z79.899: ICD-10-CM

## 2025-02-02 DIAGNOSIS — F41.9: ICD-10-CM

## 2025-02-02 DIAGNOSIS — E78.5: ICD-10-CM

## 2025-02-02 DIAGNOSIS — Z88.8: ICD-10-CM

## 2025-02-02 DIAGNOSIS — R03.0: ICD-10-CM

## 2025-02-02 DIAGNOSIS — E03.9: ICD-10-CM

## 2025-02-02 DIAGNOSIS — I10: ICD-10-CM

## 2025-02-02 DIAGNOSIS — R00.1: Primary | ICD-10-CM

## 2025-02-02 DIAGNOSIS — F03.90: ICD-10-CM

## 2025-02-02 DIAGNOSIS — Z88.2: ICD-10-CM

## 2025-02-02 LAB
ALBUMIN SERPL BCG-MCNC: 3.4 G/DL (ref 3.2–5.2)
ALP SERPL-CCNC: 118 U/L (ref 35–104)
ALT SERPL W P-5'-P-CCNC: < 9 U/L (ref 7–40)
AST SERPL-CCNC: 15 U/L (ref ?–34)
BASOPHILS # BLD AUTO: 0 10^3/UL (ref 0–0.2)
BASOPHILS NFR BLD AUTO: 0.4 % (ref 0–1)
BILIRUB CONJ SERPL-MCNC: 0.2 MG/DL (ref ?–0.4)
BILIRUB SERPL-MCNC: 0.8 MG/DL (ref 0.3–1.2)
BUN SERPL-MCNC: 31 MG/DL (ref 9–23)
CALCIUM SERPL-MCNC: 8.7 MG/DL (ref 8.3–10.6)
CHLORIDE SERPL-SCNC: 104 MMOL/L (ref 98–107)
CO2 SERPL-SCNC: 28 MMOL/L (ref 20–31)
CREAT SERPL-MCNC: 1.77 MG/DL (ref 0.55–1.3)
EOSINOPHIL # BLD AUTO: 0.1 10^3/UL (ref 0–0.5)
EOSINOPHIL NFR BLD AUTO: 0.6 % (ref 0–3)
GFR SERPL CREATININE-BSD FRML MDRD: 28.9 ML/MIN/{1.73_M2} (ref 32–?)
GLUCOSE SERPL-MCNC: 201 MG/DL (ref 74–106)
HCT VFR BLD AUTO: 40.5 % (ref 36–47)
HGB BLD-MCNC: 12.8 G/DL (ref 12–15.5)
LIPASE SERPL-CCNC: 49 U/L (ref 12–53)
LYMPHOCYTES # BLD AUTO: 0.7 10^3/UL (ref 1.5–5)
LYMPHOCYTES NFR BLD AUTO: 6.5 % (ref 24–44)
MCH RBC QN AUTO: 29.8 PG (ref 27–33)
MCHC RBC AUTO-ENTMCNC: 31.6 G/DL (ref 32–36.5)
MCV RBC AUTO: 94.4 FL (ref 80–96)
MONOCYTES # BLD AUTO: 0.5 10^3/UL (ref 0–0.8)
MONOCYTES NFR BLD AUTO: 4.2 % (ref 2–8)
NEUTROPHILS # BLD AUTO: 9.5 10^3/UL (ref 1.5–8.5)
NEUTROPHILS NFR BLD AUTO: 87.8 % (ref 36–66)
PLATELET # BLD AUTO: 222 10^3/UL (ref 150–450)
POTASSIUM SERPL-SCNC: 3.9 MMOL/L (ref 3.5–5.1)
PROT SERPL-MCNC: 6.7 G/DL (ref 5.7–8.2)
RBC # BLD AUTO: 4.29 10^6/UL (ref 4–5.4)
SODIUM SERPL-SCNC: 143 MMOL/L (ref 136–145)
T4 FREE SERPL-MCNC: 1.39 NG/DL (ref 0.89–1.76)
TSH SERPL DL<=0.005 MIU/L-ACNC: 0.29 UIU/ML (ref 0.55–4.78)
WBC # BLD AUTO: 10.9 10^3/UL (ref 4–10)

## 2025-02-02 RX ADMIN — SODIUM CHLORIDE ONE MLS/HR: 9 INJECTION, SOLUTION INTRAVENOUS at 14:48
